# Patient Record
Sex: MALE | Race: WHITE | NOT HISPANIC OR LATINO
[De-identification: names, ages, dates, MRNs, and addresses within clinical notes are randomized per-mention and may not be internally consistent; named-entity substitution may affect disease eponyms.]

---
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
## 2019-05-22 ENCOUNTER — FORM ENCOUNTER (OUTPATIENT)
Age: 19
End: 2019-05-22

## 2019-05-23 ENCOUNTER — APPOINTMENT (OUTPATIENT)
Dept: ORTHOPEDIC SURGERY | Facility: CLINIC | Age: 19
End: 2019-05-23
Payer: COMMERCIAL

## 2019-05-23 ENCOUNTER — OUTPATIENT (OUTPATIENT)
Dept: OUTPATIENT SERVICES | Facility: HOSPITAL | Age: 19
LOS: 1 days | End: 2019-05-23
Payer: COMMERCIAL

## 2019-05-23 VITALS
SYSTOLIC BLOOD PRESSURE: 110 MMHG | OXYGEN SATURATION: 98 % | HEIGHT: 66 IN | DIASTOLIC BLOOD PRESSURE: 70 MMHG | WEIGHT: 167 LBS | BODY MASS INDEX: 26.84 KG/M2 | HEART RATE: 63 BPM

## 2019-05-23 DIAGNOSIS — M22.2X2 PATELLOFEMORAL DISORDERS, LEFT KNEE: ICD-10-CM

## 2019-05-23 PROCEDURE — 99213 OFFICE O/P EST LOW 20 MIN: CPT

## 2019-05-23 PROCEDURE — 73564 X-RAY EXAM KNEE 4 OR MORE: CPT | Mod: 26,50

## 2019-05-23 PROCEDURE — 73564 X-RAY EXAM KNEE 4 OR MORE: CPT

## 2019-05-29 ENCOUNTER — APPOINTMENT (OUTPATIENT)
Dept: ORTHOPEDIC SURGERY | Facility: CLINIC | Age: 19
End: 2019-05-29

## 2019-06-05 NOTE — DISCUSSION/SUMMARY
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 He will send us a copy of his MRI from [place]. We will see him back on an as needed basis. He will call if any issues arise.

## 2019-06-05 NOTE — PHYSICAL EXAM
[de-identified] : The patient is a well developed, well nourished male in no apparent distress. He is alert and oriented X 3 with a pleasant mood and appropriate affect. \par \par On physical examination of the left knee, there is full range of motion. The patient walks with a normal gait and stands in neutral alignment. There is no effusion. No warmth or erythema is noted. The patella is tender to palpation medially and laterally. There is patellofemoral crepitus noted. The apprehension and grind tests are negative. The extensor mechanism is intact. There is no joint line tenderness. The Nate sign is absent. The Lachman and pivot shift tests are negative. There is no varus or valgus laxity at 0 or 30 degrees. No posterolateral or anteromedial laxity is noted. No masses are palpable. No other soft tissue or bony tenderness is noted. There is some tenderness noted on palpation of the IT band. Quadriceps weakness is noted. Neurovascular function is intact.   [de-identified] : Radiographs show well aligned and well maintained joint spacing bilaterally

## 2019-06-05 NOTE — END OF VISIT
[FreeTextEntry3] : All medical record entries made by KELLY Vega, acting as a scribe for this encounter under the direction of Vlad Zayas MD . I have reviewed the chart and agree that the record accurately reflects my personal performance of the history, physical exam, assessment and plan. I have also personally directed, reviewed, and agreed with the chart.

## 2019-06-05 NOTE — HISTORY OF PRESENT ILLNESS
[de-identified] : Amarjit returns today for evaluation of his left knee. He sustained an injury to his left knee last fall. 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 he developed significant anterior knee pain and had initial swelling. he had an MRI in [place] and was told that his knee was ok to return to soccer. He has some treatment with the trainers. He still reports some persistent anterior knee pain and stiffness. He denies any instability, locking or swelling. he had left ACL reconstruction in 2016.

## 2020-09-21 ENCOUNTER — TRANSCRIPTION ENCOUNTER (OUTPATIENT)
Age: 20
End: 2020-09-21

## 2021-03-23 ENCOUNTER — APPOINTMENT (OUTPATIENT)
Dept: FAMILY MEDICINE | Facility: CLINIC | Age: 21
End: 2021-03-23
Payer: MEDICAID

## 2021-03-23 VITALS
TEMPERATURE: 98.1 F | WEIGHT: 180 LBS | BODY MASS INDEX: 28.93 KG/M2 | OXYGEN SATURATION: 98 % | HEIGHT: 66 IN | SYSTOLIC BLOOD PRESSURE: 120 MMHG | HEART RATE: 65 BPM | DIASTOLIC BLOOD PRESSURE: 64 MMHG

## 2021-03-23 LAB
25(OH)D3 SERPL-MCNC: 32.5 NG/ML
ALBUMIN SERPL ELPH-MCNC: 5.2 G/DL
ALP BLD-CCNC: 67 U/L
ALT SERPL-CCNC: 40 U/L
ANION GAP SERPL CALC-SCNC: 10 MMOL/L
AST SERPL-CCNC: 29 U/L
BASOPHILS # BLD AUTO: 0.03 K/UL
BASOPHILS NFR BLD AUTO: 0.7 %
BILIRUB SERPL-MCNC: 0.7 MG/DL
BUN SERPL-MCNC: 18 MG/DL
CALCIUM SERPL-MCNC: 10.5 MG/DL
CHLORIDE SERPL-SCNC: 103 MMOL/L
CHOLEST SERPL-MCNC: 223 MG/DL
CO2 SERPL-SCNC: 28 MMOL/L
COVID-19 NUCLEOCAPSID  GAM ANTIBODY INTERPRETATION: NEGATIVE
CREAT SERPL-MCNC: 1.01 MG/DL
EOSINOPHIL # BLD AUTO: 0.09 K/UL
EOSINOPHIL NFR BLD AUTO: 2.1 %
GLUCOSE SERPL-MCNC: 98 MG/DL
HCT VFR BLD CALC: 48.7 %
HDLC SERPL-MCNC: 63 MG/DL
HGB BLD-MCNC: 15.3 G/DL
IMM GRANULOCYTES NFR BLD AUTO: 0 %
LDLC SERPL CALC-MCNC: 142 MG/DL
LYMPHOCYTES # BLD AUTO: 1.6 K/UL
LYMPHOCYTES NFR BLD AUTO: 37.8 %
MAN DIFF?: NORMAL
MCHC RBC-ENTMCNC: 27.1 PG
MCHC RBC-ENTMCNC: 31.4 GM/DL
MCV RBC AUTO: 86.3 FL
MONOCYTES # BLD AUTO: 0.35 K/UL
MONOCYTES NFR BLD AUTO: 8.3 %
NEUTROPHILS # BLD AUTO: 2.16 K/UL
NEUTROPHILS NFR BLD AUTO: 51.1 %
NONHDLC SERPL-MCNC: 160 MG/DL
PLATELET # BLD AUTO: 264 K/UL
POTASSIUM SERPL-SCNC: 4.6 MMOL/L
PROT SERPL-MCNC: 7 G/DL
RBC # BLD: 5.64 M/UL
RBC # FLD: 11.9 %
SARS-COV-2 AB SERPL QL IA: 0.07 INDEX
SODIUM SERPL-SCNC: 142 MMOL/L
T4 FREE SERPL-MCNC: 1.3 NG/DL
THYROGLOB AB SERPL-ACNC: 27.1 IU/ML
THYROPEROXIDASE AB SERPL IA-ACNC: 12.8 IU/ML
TRIGL SERPL-MCNC: 92 MG/DL
TSH SERPL-ACNC: 3.14 UIU/ML
WBC # FLD AUTO: 4.23 K/UL

## 2021-03-23 PROCEDURE — 99072 ADDL SUPL MATRL&STAF TM PHE: CPT

## 2021-03-23 PROCEDURE — 99385 PREV VISIT NEW AGE 18-39: CPT | Mod: 25

## 2021-03-23 RX ORDER — SERTRALINE 25 MG/1
25 TABLET, FILM COATED ORAL
Qty: 30 | Refills: 0 | Status: DISCONTINUED | COMMUNITY
Start: 2019-04-03 | End: 2021-03-23

## 2021-03-23 NOTE — HEALTH RISK ASSESSMENT
[Excellent] : ~his/her~  mood as  excellent [] : No [Yes] : Yes [With Significant Other] : lives with significant other [Employed] : employed [Fully functional (bathing, dressing, toileting, transferring, walking, feeding)] : Fully functional (bathing, dressing, toileting, transferring, walking, feeding) [Fully functional (using the telephone, shopping, preparing meals, housekeeping, doing laundry, using] : Fully functional and needs no help or supervision to perform IADLs (using the telephone, shopping, preparing meals, housekeeping, doing laundry, using transportation, managing medications and managing finances) [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

## 2021-03-23 NOTE — ASSESSMENT
[FreeTextEntry1] : Patient was counseled on healthy eating habits, on daily exercise and stress relief. All medications and allergies were reviewed with the patient and any adjustments necessary were made. Patient was counseled to try engage in an exercise activity for at least 30 minutes 3-4 times a week.  Patient was advised to eat a diet low in fat and carbohydrates and high in protein, with plenty of vegetables. Patient was advised to try and engage in relaxing activities whenever possible.\par The patients blood was draw in office and will be followed and assessed for any issues.  Patient was told to return to the office if any issues arise.  Unless otherwise stated, the patient is to continue all other medications as previously prescribed.\par \par patient was told he had a thyroid disorder\par patient is symptomatic, always tired, gained weight\par thyroid scan

## 2021-03-23 NOTE — HISTORY OF PRESENT ILLNESS
[de-identified] : 21 year old male here to establish care and for a full physical examination. The patients complete medical, family and social history was documented and reviewed with the patient.  The patients medications were identified and also reviewed with the patient as well as any allergies to any medications. All active and previous medical problems were identified and discussed with the patient.  Any new problems were documented. Patient is feeling well today.\par

## 2021-06-03 ENCOUNTER — APPOINTMENT (OUTPATIENT)
Dept: ENDOCRINOLOGY | Facility: CLINIC | Age: 21
End: 2021-06-03

## 2021-09-21 ENCOUNTER — APPOINTMENT (OUTPATIENT)
Dept: FAMILY MEDICINE | Facility: CLINIC | Age: 21
End: 2021-09-21
Payer: MEDICAID

## 2021-09-21 VITALS
HEIGHT: 66 IN | WEIGHT: 177 LBS | TEMPERATURE: 98.3 F | DIASTOLIC BLOOD PRESSURE: 84 MMHG | BODY MASS INDEX: 28.45 KG/M2 | OXYGEN SATURATION: 98 % | HEART RATE: 83 BPM | SYSTOLIC BLOOD PRESSURE: 130 MMHG

## 2021-09-21 DIAGNOSIS — R53.83 OTHER MALAISE: ICD-10-CM

## 2021-09-21 DIAGNOSIS — R53.81 OTHER MALAISE: ICD-10-CM

## 2021-09-21 PROCEDURE — 99214 OFFICE O/P EST MOD 30 MIN: CPT | Mod: 25

## 2021-09-21 NOTE — HEALTH RISK ASSESSMENT
[] : No [Yes] : Yes [Excellent] : ~his/her~  mood as  excellent [With Significant Other] : lives with significant other [Employed] : employed [Fully functional (bathing, dressing, toileting, transferring, walking, feeding)] : Fully functional (bathing, dressing, toileting, transferring, walking, feeding) [Fully functional (using the telephone, shopping, preparing meals, housekeeping, doing laundry, using] : Fully functional and needs no help or supervision to perform IADLs (using the telephone, shopping, preparing meals, housekeeping, doing laundry, using transportation, managing medications and managing finances) [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

## 2021-09-21 NOTE — HISTORY OF PRESENT ILLNESS
[de-identified] : 21 year old male is here for a followup visit. Patient is here for medication renewals and for blood work discussion. Medications and allergies were reviewed and assessed.  \par \par feeling tired and is weight fluctuations going on \par

## 2021-09-21 NOTE — ASSESSMENT
[FreeTextEntry1] : patient is very concerned about his thyroid\par worried, tired.\par \par thyroid\par patient was told he had a thyroid disorder\par patient is symptomatic, always tired, gained weight\par thyroid scan\par \par OCD\par Discussed diagnosis of anxiety and depression with the patient and potential outcomes/side affects of treatment versus non treatment. Medications were assessed and described at length. Side affects and black box warning were discussed.  Patient was advised to continue will all medications prescribed and the need for compliance was discussed and emphasized. Patient was advised to not stop medications without discussing with a health care provider first. Patient was advised to continue psychotherapy or seek therapy if not currently attending. Patient was educated on addictive potential of controlled substances and was counseled to use only as needed and sparingly. Patient verbalized understanding of all the above and all questions were answered.\par \par over thinks, anxious, overwhelmed, thinks about things over and over again, cannot let things go, hard time finishing tasks, having hard time in life, depressed, gets sad, since he was a kid\par was seeing psychiatrist, stopped\par will try zoloft and reassess\par \par feels his weight fluctuates and is concerned

## 2021-09-22 LAB
25(OH)D3 SERPL-MCNC: 57.7 NG/ML
ALBUMIN SERPL ELPH-MCNC: 5.1 G/DL
ALP BLD-CCNC: 64 U/L
ALT SERPL-CCNC: 27 U/L
ANION GAP SERPL CALC-SCNC: 12 MMOL/L
AST SERPL-CCNC: 22 U/L
BASOPHILS # BLD AUTO: 0.03 K/UL
BASOPHILS NFR BLD AUTO: 0.7 %
BILIRUB SERPL-MCNC: 1 MG/DL
BUN SERPL-MCNC: 18 MG/DL
CALCIUM SERPL-MCNC: 10.1 MG/DL
CHLORIDE SERPL-SCNC: 105 MMOL/L
CHOLEST SERPL-MCNC: 232 MG/DL
CO2 SERPL-SCNC: 26 MMOL/L
CREAT SERPL-MCNC: 1.03 MG/DL
EOSINOPHIL # BLD AUTO: 0.12 K/UL
EOSINOPHIL NFR BLD AUTO: 2.7 %
GLUCOSE SERPL-MCNC: 79 MG/DL
HCT VFR BLD CALC: 45 %
HDLC SERPL-MCNC: 61 MG/DL
HGB BLD-MCNC: 14.9 G/DL
IMM GRANULOCYTES NFR BLD AUTO: 0.2 %
LDLC SERPL CALC-MCNC: 152 MG/DL
LYMPHOCYTES # BLD AUTO: 2.02 K/UL
LYMPHOCYTES NFR BLD AUTO: 45 %
MAN DIFF?: NORMAL
MCHC RBC-ENTMCNC: 28.2 PG
MCHC RBC-ENTMCNC: 33.1 GM/DL
MCV RBC AUTO: 85.1 FL
MONOCYTES # BLD AUTO: 0.38 K/UL
MONOCYTES NFR BLD AUTO: 8.5 %
NEUTROPHILS # BLD AUTO: 1.93 K/UL
NEUTROPHILS NFR BLD AUTO: 42.9 %
NONHDLC SERPL-MCNC: 170 MG/DL
PLATELET # BLD AUTO: 262 K/UL
POTASSIUM SERPL-SCNC: 4.3 MMOL/L
PROT SERPL-MCNC: 7.4 G/DL
RBC # BLD: 5.29 M/UL
RBC # FLD: 12.2 %
SODIUM SERPL-SCNC: 144 MMOL/L
T4 FREE SERPL-MCNC: 1.4 NG/DL
THYROGLOB AB SERPL-ACNC: <20 IU/ML
THYROPEROXIDASE AB SERPL IA-ACNC: 12.9 IU/ML
TRIGL SERPL-MCNC: 93 MG/DL
TSH SERPL-ACNC: 5.43 UIU/ML
WBC # FLD AUTO: 4.49 K/UL

## 2021-09-24 LAB — TESTOST SERPL-MCNC: 419 NG/DL

## 2021-10-06 ENCOUNTER — APPOINTMENT (OUTPATIENT)
Dept: ENDOCRINOLOGY | Facility: CLINIC | Age: 21
End: 2021-10-06
Payer: MEDICAID

## 2021-10-06 VITALS
HEIGHT: 66 IN | HEART RATE: 76 BPM | RESPIRATION RATE: 16 BRPM | WEIGHT: 179 LBS | OXYGEN SATURATION: 98 % | TEMPERATURE: 98.2 F | BODY MASS INDEX: 28.77 KG/M2 | DIASTOLIC BLOOD PRESSURE: 80 MMHG | SYSTOLIC BLOOD PRESSURE: 122 MMHG

## 2021-10-06 PROCEDURE — 99203 OFFICE O/P NEW LOW 30 MIN: CPT

## 2021-10-06 NOTE — HISTORY OF PRESENT ILLNESS
[FreeTextEntry1] : 20 yo M with a hx of abnormal thyroid tests\par FH-aunt with thy problem\par \par poor sleep patern\par Wt varies\par No muscle weakness, cold intolerance or constipation\par \par Feels tired daily

## 2021-10-06 NOTE — REVIEW OF SYSTEMS
[Fatigue] : fatigue [Recent Weight Gain (___ Lbs)] : recent weight gain: [unfilled] lbs [Constipation] : constipation [Anxiety] : anxiety [Negative] : Heme/Lymph [Decreased Appetite] : appetite not decreased [Recent Weight Loss (___ Lbs)] : no recent weight loss [Palpitations] : no palpitations [Diarrhea] : no diarrhea [Dry Skin] : no dry skin [Tremors] : no tremors

## 2021-10-06 NOTE — ASSESSMENT
[FreeTextEntry1] : Labs reviewed, FT4 nl , and TSH-5.4\par I do not feel that his symtoms are due to any subcinical Hypothyroidism \par \par Will observe at present and repeat TFT in 6 mo\par

## 2021-10-06 NOTE — PHYSICAL EXAM
[Alert] : alert [No Acute Distress] : no acute distress [PERRL] : pupils equal, round and reactive to light [No Proptosis] : no proptosis [No Neck Mass] : no neck mass was observed [Thyroid Not Enlarged] : the thyroid was not enlarged [No Respiratory Distress] : no respiratory distress [Clear to Auscultation] : lungs were clear to auscultation bilaterally [Normal Rate] : heart rate was normal [Regular Rhythm] : with a regular rhythm [Soft] : abdomen soft [Oriented x3] : oriented to person, place, and time [Normal Insight/Judgement] : insight and judgment were intact

## 2021-10-25 ENCOUNTER — APPOINTMENT (OUTPATIENT)
Dept: FAMILY MEDICINE | Facility: CLINIC | Age: 21
End: 2021-10-25
Payer: MEDICAID

## 2021-10-25 VITALS
HEART RATE: 78 BPM | WEIGHT: 180 LBS | OXYGEN SATURATION: 98 % | SYSTOLIC BLOOD PRESSURE: 120 MMHG | DIASTOLIC BLOOD PRESSURE: 80 MMHG

## 2021-10-25 PROCEDURE — 99214 OFFICE O/P EST MOD 30 MIN: CPT | Mod: 25

## 2021-10-25 NOTE — HEALTH RISK ASSESSMENT
[] : No [Yes] : Yes [Excellent] : ~his/her~  mood as  excellent [With Significant Other] : lives with significant other [Employed] : employed [Fully functional (bathing, dressing, toileting, transferring, walking, feeding)] : Fully functional (bathing, dressing, toileting, transferring, walking, feeding) [Fully functional (using the telephone, shopping, preparing meals, housekeeping, doing laundry, using] : Fully functional and needs no help or supervision to perform IADLs (using the telephone, shopping, preparing meals, housekeeping, doing laundry, using transportation, managing medications and managing finances) [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

## 2021-10-25 NOTE — ASSESSMENT
[FreeTextEntry1] : patient is very concerned about his thyroid\par worried, tired, saw endo and said to keep monitoring\par \par thyroid\par patient was told he had a thyroid disorder\par patient is symptomatic, always tired, gained weight\par thyroid scan\par reviewed endo note, continue to monitor\par \par OCD\par Discussed diagnosis of anxiety and depression with the patient and potential outcomes/side affects of treatment versus non treatment. Medications were assessed and described at length. Side affects and black box warning were discussed.  Patient was advised to continue will all medications prescribed and the need for compliance was discussed and emphasized. Patient was advised to not stop medications without discussing with a health care provider first. Patient was advised to continue psychotherapy or seek therapy if not currently attending. Patient was educated on addictive potential of controlled substances and was counseled to use only as needed and sparingly. Patient verbalized understanding of all the above and all questions were answered.\par \par over thinks, anxious, overwhelmed, thinks about things over and over again, cannot let things go, hard time finishing tasks, having hard time in life, depressed, gets sad, since he was a kid\par was seeing psychiatrist, stopped\par \par now on zoloft 50, less depressed, not overwhelmed, less OCD still over thinking, definitely better at finishing tasks, running better and longer, 50% improvement\par will try and increase to 75 and reassess\par \par feels his weight fluctuates and is concerned

## 2021-10-25 NOTE — HISTORY OF PRESENT ILLNESS
[de-identified] : 21 year old male is here for a followup visit. Patient is here for medication renewals and for blood work discussion. Medications and allergies were reviewed and assessed.  There has been no new medications since the last visit. Patient is feeling well with no active changes or issues since His last visit.\par \par feeling tired and is weight fluctuations going on \par

## 2021-11-24 ENCOUNTER — APPOINTMENT (OUTPATIENT)
Dept: FAMILY MEDICINE | Facility: CLINIC | Age: 21
End: 2021-11-24

## 2021-12-28 ENCOUNTER — APPOINTMENT (OUTPATIENT)
Dept: FAMILY MEDICINE | Facility: CLINIC | Age: 21
End: 2021-12-28
Payer: MEDICAID

## 2021-12-28 PROCEDURE — 99214 OFFICE O/P EST MOD 30 MIN: CPT | Mod: 95

## 2021-12-28 NOTE — ASSESSMENT
[FreeTextEntry1] : patient is very concerned about his thyroid\par worried, tired, saw endo and said to keep monitoring\par \par thyroid\par patient was told he had a thyroid disorder\par patient is symptomatic, always tired, gained weight\par thyroid scan\par reviewed endo note, continue to monitor\par \par OCD\par Discussed diagnosis of anxiety and depression with the patient and potential outcomes/side affects of treatment versus non treatment. Medications were assessed and described at length. Side affects and black box warning were discussed.  Patient was advised to continue will all medications prescribed and the need for compliance was discussed and emphasized. Patient was advised to not stop medications without discussing with a health care provider first. Patient was advised to continue psychotherapy or seek therapy if not currently attending. Patient was educated on addictive potential of controlled substances and was counseled to use only as needed and sparingly. Patient verbalized understanding of all the above and all questions were answered.\par \par prior to medications: over thinks, anxious, overwhelmed, thinks about things over and over again, cannot let things go, hard time finishing tasks, having hard time in life, depressed, gets sad, since he was a kid\par was seeing psychiatrist, stopped\par \par then on zoloft 50, less depressed, not overwhelmed, less OCD still over thinking, definitely better at finishing tasks, running better and longer, 50% improvement\par but, 12/21, feels that its holding him back in the gym\par has new techniques, wants to try oils and self help, ocd skills\par will wean off, weaning protocol \par advised patient that just because he did not like the side affect of fatigue with the zolof that another ssri might be able to work, however patient declines wanting to try \par will report back to how he is feeling

## 2021-12-28 NOTE — HISTORY OF PRESENT ILLNESS
[Home] : at home, [unfilled] , at the time of the visit. [Medical Office: ([facility])___] : at the medical office located in  [Verbal consent obtained from patient] : the patient, [unfilled] [de-identified] : 21 year old male is here for a followup visit. Patient is here for medication renewals and for blood work discussion. Medications and allergies were reviewed and assessed.  There has been no new medications since the last visit. Patient is feeling well with no active changes or issues since His last visit.\par on zoloft, wishes to wean off\par feeling tired and is weight fluctuations going on \par

## 2021-12-28 NOTE — HEALTH RISK ASSESSMENT
[Yes] : Yes [Excellent] : ~his/her~  mood as  excellent [With Significant Other] : lives with significant other [Employed] : employed [Fully functional (bathing, dressing, toileting, transferring, walking, feeding)] : Fully functional (bathing, dressing, toileting, transferring, walking, feeding) [Fully functional (using the telephone, shopping, preparing meals, housekeeping, doing laundry, using] : Fully functional and needs no help or supervision to perform IADLs (using the telephone, shopping, preparing meals, housekeeping, doing laundry, using transportation, managing medications and managing finances) [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

## 2022-04-06 ENCOUNTER — APPOINTMENT (OUTPATIENT)
Dept: ENDOCRINOLOGY | Facility: CLINIC | Age: 22
End: 2022-04-06

## 2022-07-13 ENCOUNTER — APPOINTMENT (OUTPATIENT)
Dept: FAMILY MEDICINE | Facility: CLINIC | Age: 22
End: 2022-07-13

## 2022-07-13 VITALS
HEART RATE: 77 BPM | DIASTOLIC BLOOD PRESSURE: 86 MMHG | HEIGHT: 66 IN | WEIGHT: 180 LBS | OXYGEN SATURATION: 95 % | BODY MASS INDEX: 28.93 KG/M2 | SYSTOLIC BLOOD PRESSURE: 124 MMHG

## 2022-07-13 LAB
ALBUMIN SERPL ELPH-MCNC: 5.4 G/DL
ALP BLD-CCNC: 69 U/L
ALT SERPL-CCNC: 29 U/L
ANION GAP SERPL CALC-SCNC: 14 MMOL/L
AST SERPL-CCNC: 26 U/L
BASOPHILS # BLD AUTO: 0.05 K/UL
BASOPHILS NFR BLD AUTO: 1 %
BILIRUB SERPL-MCNC: 1.3 MG/DL
BUN SERPL-MCNC: 22 MG/DL
CALCIUM SERPL-MCNC: 10.1 MG/DL
CHLORIDE SERPL-SCNC: 100 MMOL/L
CHOLEST SERPL-MCNC: 255 MG/DL
CO2 SERPL-SCNC: 26 MMOL/L
CREAT SERPL-MCNC: 1.09 MG/DL
EGFR: 97 ML/MIN/1.73M2
EOSINOPHIL # BLD AUTO: 0.09 K/UL
EOSINOPHIL NFR BLD AUTO: 1.9 %
GLUCOSE SERPL-MCNC: 90 MG/DL
HCT VFR BLD CALC: 48.9 %
HDLC SERPL-MCNC: 73 MG/DL
HGB BLD-MCNC: 16.3 G/DL
IMM GRANULOCYTES NFR BLD AUTO: 0.2 %
LDLC SERPL CALC-MCNC: 165 MG/DL
LYMPHOCYTES # BLD AUTO: 1.83 K/UL
LYMPHOCYTES NFR BLD AUTO: 37.7 %
MAN DIFF?: NORMAL
MCHC RBC-ENTMCNC: 28.2 PG
MCHC RBC-ENTMCNC: 33.3 GM/DL
MCV RBC AUTO: 84.5 FL
MONOCYTES # BLD AUTO: 0.45 K/UL
MONOCYTES NFR BLD AUTO: 9.3 %
NEUTROPHILS # BLD AUTO: 2.43 K/UL
NEUTROPHILS NFR BLD AUTO: 49.9 %
NONHDLC SERPL-MCNC: 182 MG/DL
PLATELET # BLD AUTO: 280 K/UL
POTASSIUM SERPL-SCNC: 4.1 MMOL/L
PROT SERPL-MCNC: 8 G/DL
RBC # BLD: 5.79 M/UL
RBC # FLD: 12.4 %
SODIUM SERPL-SCNC: 140 MMOL/L
TRIGL SERPL-MCNC: 85 MG/DL
TSH SERPL-ACNC: 4.07 UIU/ML
WBC # FLD AUTO: 4.86 K/UL

## 2022-07-13 PROCEDURE — 36415 COLL VENOUS BLD VENIPUNCTURE: CPT

## 2022-07-13 PROCEDURE — 99395 PREV VISIT EST AGE 18-39: CPT | Mod: 25

## 2022-07-13 RX ORDER — SERTRALINE HYDROCHLORIDE 100 MG/1
100 TABLET, FILM COATED ORAL DAILY
Qty: 1 | Refills: 0 | Status: DISCONTINUED | COMMUNITY
Start: 2021-09-21 | End: 2022-07-13

## 2022-07-13 NOTE — HEALTH RISK ASSESSMENT
[Excellent] : ~his/her~  mood as  excellent [Never] : Never [Yes] : Yes [No falls in past year] : Patient reported no falls in the past year [0] : 2) Feeling down, depressed, or hopeless: Not at all (0) [PHQ-2 Negative - No further assessment needed] : PHQ-2 Negative - No further assessment needed [GME1Tmvqa] : 0 [With Significant Other] : lives with significant other [Employed] : employed [Fully functional (bathing, dressing, toileting, transferring, walking, feeding)] : Fully functional (bathing, dressing, toileting, transferring, walking, feeding) [Fully functional (using the telephone, shopping, preparing meals, housekeeping, doing laundry, using] : Fully functional and needs no help or supervision to perform IADLs (using the telephone, shopping, preparing meals, housekeeping, doing laundry, using transportation, managing medications and managing finances) [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

## 2022-07-13 NOTE — HISTORY OF PRESENT ILLNESS
[de-identified] : 22 year old male  here for annual well visit. Patient's blood work was drawn and medications reviewed. Patient's past medical history was reviewed, allergies verified and problems were identified and assessed. Patients medications were reviewed. Patient is feeling well with no new or active complaints at this time.\par \par

## 2022-07-13 NOTE — ASSESSMENT
[FreeTextEntry1] : patient is very concerned about his thyroid\par worried, tired, saw endo and said to keep monitoring\par \par thyroid\par patient was told he had a thyroid disorder\par patient is symptomatic, always tired, gained weight\par thyroid scan\par reviewed endo note, continue to monitor\par \par OCD\par Discussed diagnosis of anxiety and depression with the patient and potential outcomes/side affects of treatment versus non treatment. Medications were assessed and described at length. Side affects and black box warning were discussed.  Patient was advised to continue will all medications prescribed and the need for compliance was discussed and emphasized. Patient was advised to not stop medications without discussing with a health care provider first. Patient was advised to continue psychotherapy or seek therapy if not currently attending. Patient was educated on addictive potential of controlled substances and was counseled to use only as needed and sparingly. Patient verbalized understanding of all the above and all questions were answered.\par \par prior to medications: over thinks, anxious, overwhelmed, thinks about things over and over again, cannot let things go, hard time finishing tasks, having hard time in life, depressed, gets sad, since he was a kid\par was seeing psychiatrist, stopped\par \par then on zoloft 50, less depressed, not overwhelmed, less OCD still over thinking, definitely better at finishing tasks, running better and longer, 50% improvement\par but, 12/21, feels that its holding him back in the gym\par has new techniques, wants to try oils and self help, ocd skills\par will wean off, weaning protocol \par advised patient that just because he did not like the side affect of fatigue with the zolof that another ssri might be able to work, however patient declines wanting to try \par will report back to how he is feeling\par no longer on anything, doing okay

## 2022-07-14 ENCOUNTER — NON-APPOINTMENT (OUTPATIENT)
Age: 22
End: 2022-07-14

## 2022-09-13 ENCOUNTER — APPOINTMENT (OUTPATIENT)
Dept: ORTHOPEDIC SURGERY | Facility: CLINIC | Age: 22
End: 2022-09-13

## 2022-10-25 ENCOUNTER — APPOINTMENT (OUTPATIENT)
Dept: FAMILY MEDICINE | Facility: CLINIC | Age: 22
End: 2022-10-25

## 2022-11-21 ENCOUNTER — APPOINTMENT (OUTPATIENT)
Dept: FAMILY MEDICINE | Facility: CLINIC | Age: 22
End: 2022-11-21

## 2022-11-21 ENCOUNTER — NON-APPOINTMENT (OUTPATIENT)
Age: 22
End: 2022-11-21

## 2022-11-21 VITALS
HEART RATE: 74 BPM | DIASTOLIC BLOOD PRESSURE: 80 MMHG | BODY MASS INDEX: 27.32 KG/M2 | TEMPERATURE: 98.2 F | HEIGHT: 66 IN | OXYGEN SATURATION: 98 % | SYSTOLIC BLOOD PRESSURE: 124 MMHG | WEIGHT: 170 LBS

## 2022-11-21 DIAGNOSIS — F90.9 ATTENTION-DEFICIT HYPERACTIVITY DISORDER, UNSPECIFIED TYPE: ICD-10-CM

## 2022-11-21 PROCEDURE — 36415 COLL VENOUS BLD VENIPUNCTURE: CPT

## 2022-11-21 PROCEDURE — 99214 OFFICE O/P EST MOD 30 MIN: CPT | Mod: 25

## 2022-11-21 PROCEDURE — 93000 ELECTROCARDIOGRAM COMPLETE: CPT

## 2022-11-21 NOTE — PHYSICAL EXAM
[Normal Oropharynx] : the oropharynx was normal [No Edema] : there was no peripheral edema [No Extremity Clubbing/Cyanosis] : no extremity clubbing/cyanosis [Normal] : affect was normal and insight and judgment were intact [No Lymphadenopathy] : no lymphadenopathy [Grossly Normal Strength/Tone] : grossly normal strength/tone [de-identified] : tenderness to palpation over left biceps tendon

## 2022-11-21 NOTE — HISTORY OF PRESENT ILLNESS
[FreeTextEntry1] : Here for follow-up; hx of ADHD, urinary symptoms [de-identified] : Here for follow-up; hx of ADHD, urinary symptoms\par Was previously on Adderall several years ago.\par Brings in old rx. \par Amphetamine Salt 20mg 10/02/2018-taking old medications-[facility]. filled 4 years ago.  \par \par Started 2.5 months ago-was prescribed by Neurologist-Had seen Dr. santos and then \par Dr. Quick-[facility]-had seen 3 years ago. Has not seen Neurologist recently. \par \par Had been on mood medications-Was very tired and gained weight-stopped 2 years ago.  \par \par Taking Vitamin B12 1000mcg,\par Tumeric \par \par 2-4 cups of coffee a day\par Works 14-15 hours a day.  Does feel some heart racing.\par Also notes some urinary hesitancy.\par \par Also injured left biceps a few weeks ago-went to , still with discomfort.  \par  \par

## 2022-11-21 NOTE — REVIEW OF SYSTEMS
[Nausea] : nausea [Dysuria] : dysuria [Hesitancy] : hesitancy [Negative] : Respiratory [Fever] : no fever [Chills] : no chills [Earache] : no earache [Sore Throat] : no sore throat [Chest Pain] : no chest pain [Constipation] : no constipation [Diarrhea] : no diarrhea [Vomiting] : no vomiting [Headache] : no headache [Dizziness] : no dizziness [Suicidal] : not suicidal [Depression] : no depression [FreeTextEntry4] : runny nose-deviated septum  [FreeTextEntry5] : getting episodes-getting episodes of palpitations; has had for a long time [FreeTextEntry8] : feels like he needs to force urination at times [de-identified] : had some migraines when started to take medication [de-identified] : not focusing well;

## 2022-11-21 NOTE — PLAN
[FreeTextEntry1] : EKG sinus rhythm. Non-acute. \par Check UA.\par Will follow up labwork drawn in office today.\par \par History of ADHD-advised requesting copy of previous notes from Neurology, regarding medication. May require specialist follow-up.\par Reports mood is doing well overall. \par \par Biceps Pain-Ortho eval.\par Will adjust meds based on labs. \par Patient expressed understanding of plan.\par

## 2022-11-23 ENCOUNTER — NON-APPOINTMENT (OUTPATIENT)
Age: 22
End: 2022-11-23

## 2022-11-23 DIAGNOSIS — R39.9 UNSPECIFIED SYMPTOMS AND SIGNS INVOLVING THE GENITOURINARY SYSTEM: ICD-10-CM

## 2022-11-30 ENCOUNTER — APPOINTMENT (OUTPATIENT)
Dept: ORTHOPEDIC SURGERY | Facility: CLINIC | Age: 22
End: 2022-11-30

## 2022-11-30 ENCOUNTER — NON-APPOINTMENT (OUTPATIENT)
Age: 22
End: 2022-11-30

## 2022-11-30 DIAGNOSIS — S46.212A STRAIN OF MUSCLE, FASCIA AND TENDON OF OTHER PARTS OF BICEPS, LEFT ARM, INITIAL ENCOUNTER: ICD-10-CM

## 2022-11-30 PROCEDURE — 99203 OFFICE O/P NEW LOW 30 MIN: CPT

## 2022-11-30 PROCEDURE — 73060 X-RAY EXAM OF HUMERUS: CPT | Mod: LT

## 2022-11-30 NOTE — PHYSICAL EXAM
[de-identified] : General Exam\par \par Well developed, well nourished\par No apparent distress\par Oriented to person, place, and time\par Mood: Normal\par Affect: Normal\par Balance and coordination: Normal\par Gait: Normal\par \par Left shoulder/upper arm exam\par \par Inspection: No swelling, ecchymosis or gross deformity.\par Skin: No masses, No lesions\par Tenderness:ttp muscle belly biceps,  no ttp distal biceps tendon, No bicipital tenderness, no tenderness to the greater tuberosity/RTC insertion, no anterior shoulder/lesser tuberosity tenderness. No tenderness SC joint, clavicle, AC joint.\par ROM: 160/60/T6\par Impingement tests: neg Carrero\par AC Joint: no pain with cross arm testing\par Biceps: Negative speed\par Strength: 5/5 abduction, external rotation, and internal rotation\par Neuro: AIN, PIN, Ulnar nerve motor intact\par Sensation: Intact to light touch in radial, median, ulnar, and axillary nerve distributions\par Vasc: 2+ radial pulse\par  [de-identified] : \par The following radiographs were ordered and read by me during this patients visit. I reviewed each radiograph in detail with the patient and discussed the findings as highlighted below. \par \par AP and lateral of the left humerus was obtained today.  There is no bony lesions there is no fracture unremarkable

## 2022-11-30 NOTE — HISTORY OF PRESENT ILLNESS
[de-identified] : 23yo male presents complaining of left biceps pain for 2 months.  He performs martial arts, he was sparring 1 day develop pain in his biceps muscle.  He reports that shooting pain.  He states he noticed a mild deformity at that time but has since resolved.  He went to urgent care he was evaluated told to see orthopedics.  He is here today for further intervention.  He does report continued burning and pain within his biceps muscle when doing activity.  He is .  He is doing home PT stretches without relief.  He is taking over-the-counter NSAIDs.  Denies numbness tingling\par \par The patient's past medical history, past surgical history, medications, allergies, and social history were reviewed by me today with the patient and documented accordingly. In addition, the patient's family history, which is noncontributory to this visit, was also reviewed.\par

## 2022-11-30 NOTE — DISCUSSION/SUMMARY
[de-identified] : Left biceps muscle strain.  Symmetric to the other side intact proximal and distal biceps.  Recommend a period of rest and physical therapy.  Activities as tolerated.  Follow-up as needed.  All questions answered.

## 2023-05-24 ENCOUNTER — APPOINTMENT (OUTPATIENT)
Dept: FAMILY MEDICINE | Facility: CLINIC | Age: 23
End: 2023-05-24
Payer: MEDICAID

## 2023-05-24 ENCOUNTER — NON-APPOINTMENT (OUTPATIENT)
Age: 23
End: 2023-05-24

## 2023-05-24 VITALS
DIASTOLIC BLOOD PRESSURE: 82 MMHG | HEIGHT: 66 IN | SYSTOLIC BLOOD PRESSURE: 132 MMHG | OXYGEN SATURATION: 98 % | BODY MASS INDEX: 28.12 KG/M2 | WEIGHT: 175 LBS | HEART RATE: 74 BPM

## 2023-05-24 DIAGNOSIS — F41.8 OTHER SPECIFIED ANXIETY DISORDERS: ICD-10-CM

## 2023-05-24 DIAGNOSIS — M25.542 PAIN IN JOINTS OF RIGHT HAND: ICD-10-CM

## 2023-05-24 DIAGNOSIS — M25.541 PAIN IN JOINTS OF RIGHT HAND: ICD-10-CM

## 2023-05-24 DIAGNOSIS — S61.209A UNSPECIFIED OPEN WOUND OF UNSPECIFIED FINGER W/OUT DAMAGE TO NAIL, INITIAL ENCOUNTER: ICD-10-CM

## 2023-05-24 PROCEDURE — 36415 COLL VENOUS BLD VENIPUNCTURE: CPT

## 2023-05-24 PROCEDURE — 99214 OFFICE O/P EST MOD 30 MIN: CPT | Mod: 25

## 2023-05-24 PROCEDURE — 93000 ELECTROCARDIOGRAM COMPLETE: CPT

## 2023-05-26 NOTE — HISTORY OF PRESENT ILLNESS
[FreeTextEntry8] : Here for wrist and hand pain, episodes of palpitations, left side pain, cut on thumb. \par \par Reports Wrist pain and swelling in both hands-wrists and arms.  Going on for about 6 months.  \par Takes advil at times.  2 tablets does not provide much relief.  \par With certain movements up to 8/10 pain.  \par Better when not working.  Working in [employer]-full time-6-7 days a week.  \par \par Cut thumb on clean paint can today.  \par \par Has been getting episodes of palpitations.\par \par Also getting episodes of pain at times on left side.  \par \par

## 2023-05-26 NOTE — PLAN
[FreeTextEntry1] : Ate today.  Patient requesting cholesterol check.\par Will follow up labwork drawn in office today.\par \par Anxiety- Agreeable to  referral.\par \par Palpitations-EKG sinus rhythm non-acute; check labs, Advised if recurrent episodes will need Cardio eval. \par \par Cut on finger-area cleansed; steri strips applied.  \par  \par Bilateral Hand Pain-Ortho Hand evaluation-ongoing for 6 months. \par Left Side Ride-area of lateral ribs; discussed muscle strain and costochondritis component.  Continue to monitor.\par Discussed with Mr. EASTMAN precautions and advised to go to seek immediate medical re-evaluation if condition worsened.  Mr. KEIRA EASTMAN expressed understanding of the plan.\par

## 2023-05-26 NOTE — PHYSICAL EXAM
[No Acute Distress] : no acute distress [Well Developed] : well developed [Normal Oropharynx] : the oropharynx was normal [No Lymphadenopathy] : no lymphadenopathy [No Edema] : there was no peripheral edema [No Extremity Clubbing/Cyanosis] : no extremity clubbing/cyanosis [Normal] : affect was normal and insight and judgment were intact [de-identified] :  strength intact b/l; negative tinel's sign.  No active rib tenderness to palpation, patient indicates left lateral ribs are where he feels discomfort [de-identified] : cut on left thumb, no active bleeding or drainage

## 2023-05-26 NOTE — REVIEW OF SYSTEMS
[Palpitations] : palpitations [Joint Pain] : joint pain [Fever] : no fever [Chills] : no chills [Sore Throat] : no sore throat [Chest Pain] : no chest pain [Shortness Of Breath] : no shortness of breath [Wheezing] : no wheezing [Cough] : no cough [Constipation] : no constipation [Vomiting] : no vomiting [Dysuria] : no dysuria [Headache] : no headache [Dizziness] : no dizziness [Suicidal] : not suicidal [FreeTextEntry4] : feels congestion with allergies-has tried claritin   [FreeTextEntry5] : has been getting palpitations more frequently  [FreeTextEntry7] : sometimes diarrhea-episodes of stomach discomfort; not on anything for acid reflux  [de-identified] : Anxiety-feels like mood is doing okay.  Met with Psychiatrist-in high school. Sometimes feeling down.  15-Sep-2020 16:08

## 2023-05-26 NOTE — ADDENDUM
[FreeTextEntry1] : Patient was offered and declined Tdap booster at visit.  Notes was a clean object he cut hand on, was not yeison.

## 2023-06-07 ENCOUNTER — APPOINTMENT (OUTPATIENT)
Dept: ORTHOPEDIC SURGERY | Facility: CLINIC | Age: 23
End: 2023-06-07

## 2023-06-08 ENCOUNTER — TRANSCRIPTION ENCOUNTER (OUTPATIENT)
Age: 23
End: 2023-06-08

## 2023-06-12 ENCOUNTER — TRANSCRIPTION ENCOUNTER (OUTPATIENT)
Age: 23
End: 2023-06-12

## 2024-01-31 ENCOUNTER — APPOINTMENT (OUTPATIENT)
Dept: FAMILY MEDICINE | Facility: CLINIC | Age: 24
End: 2024-01-31
Payer: MEDICAID

## 2024-01-31 VITALS
DIASTOLIC BLOOD PRESSURE: 83 MMHG | OXYGEN SATURATION: 100 % | TEMPERATURE: 98.1 F | HEIGHT: 66 IN | RESPIRATION RATE: 18 BRPM | HEART RATE: 62 BPM | WEIGHT: 180 LBS | SYSTOLIC BLOOD PRESSURE: 122 MMHG | BODY MASS INDEX: 28.93 KG/M2

## 2024-01-31 DIAGNOSIS — Z01.00 ENCOUNTER FOR EXAMINATION OF EYES AND VISION W/OUT ABNORMAL FINDINGS: ICD-10-CM

## 2024-01-31 DIAGNOSIS — Z13.89 ENCOUNTER FOR SCREENING FOR OTHER DISORDER: ICD-10-CM

## 2024-01-31 DIAGNOSIS — Z00.00 ENCOUNTER FOR GENERAL ADULT MEDICAL EXAMINATION W/OUT ABNORMAL FINDINGS: ICD-10-CM

## 2024-01-31 DIAGNOSIS — E66.3 OVERWEIGHT: ICD-10-CM

## 2024-01-31 DIAGNOSIS — E07.9 DISORDER OF THYROID, UNSPECIFIED: ICD-10-CM

## 2024-01-31 DIAGNOSIS — Z82.49 FAMILY HISTORY OF ISCHEMIC HEART DISEASE AND OTHER DISEASES OF THE CIRCULATORY SYSTEM: ICD-10-CM

## 2024-01-31 DIAGNOSIS — F42.9 OBSESSIVE-COMPULSIVE DISORDER, UNSPECIFIED: ICD-10-CM

## 2024-01-31 PROCEDURE — 99395 PREV VISIT EST AGE 18-39: CPT | Mod: 25

## 2024-01-31 PROCEDURE — G0444 DEPRESSION SCREEN ANNUAL: CPT | Mod: 59

## 2024-01-31 PROCEDURE — 36415 COLL VENOUS BLD VENIPUNCTURE: CPT

## 2024-01-31 NOTE — HISTORY OF PRESENT ILLNESS
[FreeTextEntry1] : check up  [de-identified] : 01/31/2024  24 year  old male  presents for check up   PMH: ADHD, anxiety / OCD  - zoloft did not like how tired it made him feel  and weight gain   PSH: acl surgery . nose surgery  Hospitalizations : hit head couple months ago needed stitches  Social hx: alcohol use  Fam hx:     dad- two MIs- first one at 36 ,/ prostate- ?      mom - HTN     3 siblings- HLD   denying fam hx of colon cancer  Allergies: NKDA  Meds: none  Supplements:    ag1 greens supplement  Health Maintenance: Immunizations:    Declining flu vaccine -education provided.       covid unvaccinated- Education provided- had covid      tdap- [facility] - 9/2023

## 2024-01-31 NOTE — REVIEW OF SYSTEMS
[Anxiety] : anxiety [Negative] : Heme/Lymph [Dysuria] : no dysuria [Incontinence] : no incontinence [Hematuria] : no hematuria [Frequency] : no frequency [Suicidal] : not suicidal [Insomnia] : no insomnia [FreeTextEntry8] : sometimes stream is slower  [de-identified] : feels OCD and anxious at times- functioning okay however rituals can sometimes get in the way- continue CBT

## 2024-01-31 NOTE — HEALTH RISK ASSESSMENT
[Yes] : Yes [4 or more  times a week (4 pts)] : 4 or more  times a week (4 points) [1 or 2 (0 pts)] : 1 or 2 (0 points) [Never (0 pts)] : Never (0 points) [No] : In the past 12 months have you used drugs other than those required for medical reasons? No [None] : None [With Significant Other] : lives with significant other [Employed] : employed [Significant Other] : lives with significant other [Feels Safe at Home] : Feels safe at home [Fully functional (bathing, dressing, toileting, transferring, walking, feeding)] : Fully functional (bathing, dressing, toileting, transferring, walking, feeding) [Fully functional (using the telephone, shopping, preparing meals, housekeeping, doing laundry, using] : Fully functional and needs no help or supervision to perform IADLs (using the telephone, shopping, preparing meals, housekeeping, doing laundry, using transportation, managing medications and managing finances) [Smoke Detector] : smoke detector [Carbon Monoxide Detector] : carbon monoxide detector [Safety elements used in home] : safety elements used in home [Never] : Never [Good] : ~his/her~ current health as good [Fair] :  ~his/her~ mood as fair [0] : 2) Feeling down, depressed, or hopeless: Not at all (0) [PHQ-2 Negative - No further assessment needed] : PHQ-2 Negative - No further assessment needed [HIV test declined] : HIV test declined [Hepatitis C test declined] : Hepatitis C test declined [Seat Belt] :  uses seat belt [de-identified] : see hpi  [de-identified] : seeing a therapist Dr Fox Gerard - not consistently tho  [de-identified] : 1 glass of wine  [de-identified] : running  [de-identified] : eating more protein ,  [[id number]] : 0 [Change in mental status noted] : No change in mental status noted [Language] : denies difficulty with language [Reports changes in hearing] : Reports no changes in hearing [Reports changes in vision] : Reports no changes in vision [Reports changes in dental health] : Reports no changes in dental health [ColonoscopyComments] : denying FH  [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

## 2024-01-31 NOTE — PLAN
[FreeTextEntry1] : denying any CP, palps at this time  however does get intermittent palps - most likely associated with anxiety due to fam hx- cards referral   Educated regarding relaxation techniques Discussed therapy benefits- advised to continue with therapy and discuss PRozac or other medication  denies si hi  has support at home

## 2024-02-02 ENCOUNTER — TRANSCRIPTION ENCOUNTER (OUTPATIENT)
Age: 24
End: 2024-02-02

## 2024-02-02 DIAGNOSIS — R79.89 OTHER SPECIFIED ABNORMAL FINDINGS OF BLOOD CHEMISTRY: ICD-10-CM

## 2024-02-02 LAB
25(OH)D3 SERPL-MCNC: 24.6 NG/ML
ALBUMIN SERPL ELPH-MCNC: 5 G/DL
ALP BLD-CCNC: 58 U/L
ALT SERPL-CCNC: 31 U/L
ANION GAP SERPL CALC-SCNC: 10 MMOL/L
APPEARANCE: CLEAR
AST SERPL-CCNC: 31 U/L
BASOPHILS # BLD AUTO: 0.03 K/UL
BASOPHILS NFR BLD AUTO: 0.9 %
BILIRUB SERPL-MCNC: 1.1 MG/DL
BILIRUBIN URINE: NEGATIVE
BLOOD URINE: NEGATIVE
BUN SERPL-MCNC: 17 MG/DL
CALCIUM SERPL-MCNC: 9.7 MG/DL
CHLORIDE SERPL-SCNC: 100 MMOL/L
CHOLEST SERPL-MCNC: 234 MG/DL
CO2 SERPL-SCNC: 28 MMOL/L
COLOR: YELLOW
CREAT SERPL-MCNC: 1 MG/DL
EGFR: 106 ML/MIN/1.73M2
EOSINOPHIL # BLD AUTO: 0.09 K/UL
EOSINOPHIL NFR BLD AUTO: 2.6 %
ESTIMATED AVERAGE GLUCOSE: 103 MG/DL
FOLATE SERPL-MCNC: >20 NG/ML
GLUCOSE QUALITATIVE U: NEGATIVE MG/DL
GLUCOSE SERPL-MCNC: 94 MG/DL
HBA1C MFR BLD HPLC: 5.2 %
HCT VFR BLD CALC: 43.3 %
HDLC SERPL-MCNC: 67 MG/DL
HGB BLD-MCNC: 14.6 G/DL
IMM GRANULOCYTES NFR BLD AUTO: 0.3 %
KETONES URINE: NEGATIVE MG/DL
LDLC SERPL CALC-MCNC: 152 MG/DL
LEUKOCYTE ESTERASE URINE: NEGATIVE
LYMPHOCYTES # BLD AUTO: 1.07 K/UL
LYMPHOCYTES NFR BLD AUTO: 30.7 %
MAN DIFF?: NORMAL
MCHC RBC-ENTMCNC: 28.6 PG
MCHC RBC-ENTMCNC: 33.7 GM/DL
MCV RBC AUTO: 84.7 FL
MONOCYTES # BLD AUTO: 0.31 K/UL
MONOCYTES NFR BLD AUTO: 8.9 %
NEUTROPHILS # BLD AUTO: 1.98 K/UL
NEUTROPHILS NFR BLD AUTO: 56.6 %
NITRITE URINE: NEGATIVE
NONHDLC SERPL-MCNC: 166 MG/DL
PH URINE: 6.5
PLATELET # BLD AUTO: 277 K/UL
POTASSIUM SERPL-SCNC: 4.3 MMOL/L
PROT SERPL-MCNC: 7.4 G/DL
PROTEIN URINE: NEGATIVE MG/DL
RBC # BLD: 5.11 M/UL
RBC # FLD: 12.4 %
SODIUM SERPL-SCNC: 137 MMOL/L
SPECIFIC GRAVITY URINE: 1.01
TRIGL SERPL-MCNC: 83 MG/DL
TSH SERPL-ACNC: 2.35 UIU/ML
UROBILINOGEN URINE: 0.2 MG/DL
VIT B12 SERPL-MCNC: 774 PG/ML
WBC # FLD AUTO: 3.49 K/UL

## 2024-02-08 ENCOUNTER — APPOINTMENT (OUTPATIENT)
Dept: CARDIOLOGY | Facility: CLINIC | Age: 24
End: 2024-02-08
Payer: MEDICAID

## 2024-02-08 VITALS
DIASTOLIC BLOOD PRESSURE: 73 MMHG | HEART RATE: 77 BPM | OXYGEN SATURATION: 98 % | TEMPERATURE: 97.3 F | SYSTOLIC BLOOD PRESSURE: 123 MMHG

## 2024-02-08 DIAGNOSIS — E78.00 PURE HYPERCHOLESTEROLEMIA, UNSPECIFIED: ICD-10-CM

## 2024-02-08 DIAGNOSIS — R00.2 PALPITATIONS: ICD-10-CM

## 2024-02-08 DIAGNOSIS — R07.89 OTHER CHEST PAIN: ICD-10-CM

## 2024-02-08 PROCEDURE — G2211 COMPLEX E/M VISIT ADD ON: CPT | Mod: NC,1L

## 2024-02-08 PROCEDURE — 99204 OFFICE O/P NEW MOD 45 MIN: CPT

## 2024-02-20 ENCOUNTER — APPOINTMENT (OUTPATIENT)
Dept: INTERNAL MEDICINE | Facility: CLINIC | Age: 24
End: 2024-02-20

## 2024-03-17 PROBLEM — E78.00 ELEVATED CHOLESTEROL: Status: ACTIVE | Noted: 2023-05-24

## 2024-03-17 PROBLEM — R00.2 INTERMITTENT PALPITATIONS: Status: ACTIVE | Noted: 2023-05-24

## 2024-03-17 PROBLEM — R07.89 CHEST DISCOMFORT: Status: ACTIVE | Noted: 2024-03-17

## 2024-03-17 NOTE — HISTORY OF PRESENT ILLNESS
[FreeTextEntry1] : 24 year old male without past cardiac history or risks who complains of several weeks of chest pain with anad without exertion.  No SOB or H/A

## 2024-03-17 NOTE — PHYSICAL EXAM
[Well Developed] : well developed [Well Nourished] : well nourished [No Acute Distress] : no acute distress [Normal Conjunctiva] : normal conjunctiva [Normal Venous Pressure] : normal venous pressure [No Carotid Bruit] : no carotid bruit [Normal S1, S2] : normal S1, S2 [No Murmur] : no murmur [No Gallop] : no gallop [No Rub] : no rub [Clear Lung Fields] : clear lung fields [Good Air Entry] : good air entry [No Respiratory Distress] : no respiratory distress  [Soft] : abdomen soft [Non Tender] : non-tender [No Masses/organomegaly] : no masses/organomegaly [Normal Bowel Sounds] : normal bowel sounds [No Edema] : no edema [Normal Gait] : normal gait [No Cyanosis] : no cyanosis [No Clubbing] : no clubbing [No Varicosities] : no varicosities [No Rash] : no rash [No Skin Lesions] : no skin lesions [Moves all extremities] : moves all extremities [Alert and Oriented] : alert and oriented [No Focal Deficits] : no focal deficits [Normal Speech] : normal speech [Normal memory] : normal memory

## 2024-03-17 NOTE — DISCUSSION/SUMMARY
[FreeTextEntry1] : CP- atypical but get exercise stress and ECHO  Followupin 3 weeks  Palp- none  Time spent reviewing history, risks, FH, exam, Pt discussion and note writing

## 2024-03-28 ENCOUNTER — APPOINTMENT (OUTPATIENT)
Dept: CARDIOLOGY | Facility: CLINIC | Age: 24
End: 2024-03-28

## 2024-10-14 ENCOUNTER — APPOINTMENT (OUTPATIENT)
Dept: ORTHOPEDIC SURGERY | Facility: CLINIC | Age: 24
End: 2024-10-14
Payer: MEDICAID

## 2024-10-14 VITALS — HEIGHT: 66 IN | BODY MASS INDEX: 28.93 KG/M2 | WEIGHT: 180 LBS

## 2024-10-14 DIAGNOSIS — Z98.890 OTHER SPECIFIED POSTPROCEDURAL STATES: ICD-10-CM

## 2024-10-14 DIAGNOSIS — M23.92 UNSPECIFIED INTERNAL DERANGEMENT OF LEFT KNEE: ICD-10-CM

## 2024-10-14 PROCEDURE — 73562 X-RAY EXAM OF KNEE 3: CPT | Mod: LT

## 2024-10-14 PROCEDURE — 99203 OFFICE O/P NEW LOW 30 MIN: CPT

## 2024-10-17 ENCOUNTER — APPOINTMENT (OUTPATIENT)
Dept: ORTHOPEDIC SURGERY | Facility: CLINIC | Age: 24
End: 2024-10-17

## 2024-11-11 ENCOUNTER — APPOINTMENT (OUTPATIENT)
Dept: ORTHOPEDIC SURGERY | Facility: CLINIC | Age: 24
End: 2024-11-11

## 2024-12-23 ENCOUNTER — APPOINTMENT (OUTPATIENT)
Dept: FAMILY MEDICINE | Facility: CLINIC | Age: 24
End: 2024-12-23